# Patient Record
Sex: MALE
[De-identification: names, ages, dates, MRNs, and addresses within clinical notes are randomized per-mention and may not be internally consistent; named-entity substitution may affect disease eponyms.]

---

## 2018-08-21 ENCOUNTER — HOSPITAL ENCOUNTER (EMERGENCY)
Dept: HOSPITAL 5 - ED | Age: 28
Discharge: HOME | End: 2018-08-21
Payer: COMMERCIAL

## 2018-08-21 VITALS — DIASTOLIC BLOOD PRESSURE: 75 MMHG | SYSTOLIC BLOOD PRESSURE: 142 MMHG

## 2018-08-21 DIAGNOSIS — F12.10: ICD-10-CM

## 2018-08-21 DIAGNOSIS — Z88.0: ICD-10-CM

## 2018-08-21 DIAGNOSIS — F17.200: ICD-10-CM

## 2018-08-21 DIAGNOSIS — M72.2: Primary | ICD-10-CM

## 2018-08-21 DIAGNOSIS — J06.9: ICD-10-CM

## 2018-08-21 DIAGNOSIS — N50.811: ICD-10-CM

## 2018-08-21 DIAGNOSIS — J45.909: ICD-10-CM

## 2018-08-21 LAB
BUN SERPL-MCNC: 11 MG/DL (ref 9–20)
BUN/CREAT SERPL: 14 %
CALCIUM SERPL-MCNC: 9.5 MG/DL (ref 8.4–10.2)
HCT VFR BLD CALC: 43 % (ref 35.5–45.6)
HEMOLYSIS INDEX: 9
HGB BLD-MCNC: 14.8 GM/DL (ref 11.8–15.2)
MCH RBC QN AUTO: 31 PG (ref 28–32)
MCHC RBC AUTO-ENTMCNC: 34 % (ref 32–34)
MCV RBC AUTO: 89 FL (ref 84–94)
PLATELET # BLD: 215 K/MM3 (ref 140–440)
RBC # BLD AUTO: 4.84 M/MM3 (ref 3.65–5.03)

## 2018-08-21 PROCEDURE — 99284 EMERGENCY DEPT VISIT MOD MDM: CPT

## 2018-08-21 PROCEDURE — 93975 VASCULAR STUDY: CPT

## 2018-08-21 PROCEDURE — 96372 THER/PROPH/DIAG INJ SC/IM: CPT

## 2018-08-21 PROCEDURE — 85027 COMPLETE CBC AUTOMATED: CPT

## 2018-08-21 PROCEDURE — 74176 CT ABD & PELVIS W/O CONTRAST: CPT

## 2018-08-21 PROCEDURE — 80048 BASIC METABOLIC PNL TOTAL CA: CPT

## 2018-08-21 PROCEDURE — 36415 COLL VENOUS BLD VENIPUNCTURE: CPT

## 2018-08-21 NOTE — ULTRASOUND REPORT
ULTRASOUND TESTICULAR DOPPLER COMPLETE



History: Pain for 3 weeks.



Technique:  Trans-scrotal ultrasound with spectral doppler 

interrogation.



Findings:



Both testes and epididymides are normal size, contour and

echotexture.  No hydrocele or varicocele.  No mass or pathologic

calcifications.



Spectral Doppler waveforms depict symmetric arterial flow to both

testes.



IMPRESSION:

Unremarkable testicular ultrasound.

## 2018-08-21 NOTE — EMERGENCY DEPARTMENT REPORT
ED General Adult HPI





- General


Chief complaint: Urogenital-Male


Stated complaint: PAIN LT ANKLE/SIDE


Time Seen by Provider: 18 11:32


Source: patient


Mode of arrival: Ambulatory


Limitations: No Limitations





- History of Present Illness


Initial comments: 





This is a 28-year-old male here report that he has left foot pain on and off 

for 3 weeks.  He denies any fall or injury.  Denies any swelling.  He reports 

the pain is worse waking up.  He is complaining the pain to his right flank 

times several days and right testicle pain 3 weeks.  Patient denies any 

swelling to his testicles.  Denies any concern for STD or any painful 

urination.  Patient denies any abdominal pain or nausea vomiting.  He also 

reports that he has wheezes or productive cough.  He said he has a history of 

asthma and this has been going on for months.  Denies any fever or chills.  

Denies any chest pain.  Patient that he was seen at [facility].  3 weeks ago and 

had ultrasound was difficult on the told it was normal.  The pain has resolved.

  Generalized pain is 8-10 and achy.  Pain is intermittent.  He said he took 

over-the-counter pain medication which is not helping.  Denies any swelling to 

his legs.  Pain to left foot on and off and mostly in the morning.  Worse with 

movement and better with rest.


MD Complaint: multiple complaints


Onset/Timing: 3


-: week(s)


Radiation: extremity, flank


Severity scale (0 -10): 8


Quality: aching


Consistency: intermittent


Improves with: none


Worsens with: movement


Associated Symptoms: cough, other (right flank pain).  denies: confusion, chest 

pain, diaphoresis, fever/chills, headaches, loss of appetite, malaise, nausea/

vomiting, rash, seizure, shortness of breath, syncope, weakness


Treatments Prior to Arrival: other (over-the-counter medication)





- Related Data


 Previous Rx's











 Medication  Instructions  Recorded  Last Taken  Type


 


Azithromycin [Zithromax Z-SID] 250 mg PO DAILY 5 Days #1 pkg 18 Unknown Rx


 


Cetirizine HCl [ZyrTEC] 10 mg PO QDAY 21 Days #21 18 Unknown Rx





 tab.rapdis   


 


Fluticasone [Flonase] 1 spray NS QDAY 14 Days #1 bottle 18 Unknown Rx


 


Ibuprofen [Motrin] 800 mg PO Q8HR PRN #15 tablet 18 Unknown Rx


 


methylPREDNISolone [Medrol Dose 4 mg PO DAILY #1 tab.ds.pk 18 Unknown Rx





Sid]    











 Allergies











Allergy/AdvReac Type Severity Reaction Status Date / Time


 


Penicillins Allergy  Unknown Verified 18 09:50














ED Review of Systems


ROS: 


Stated complaint: PAIN LT ANKLE/SIDE


Other details as noted in HPI





Constitutional: denies: chills, fever


ENT: denies: ear pain, throat pain, congestion


Respiratory: cough, wheezing.  denies: shortness of breath, SOB with exertion, 

SOB at rest, stridor


Cardiovascular: denies: chest pain, palpitations, edema, syncope


Gastrointestinal: denies: abdominal pain, nausea, vomiting, diarrhea, 

constipation, hematemesis, melena, hematochezia


Genitourinary: denies: urgency, dysuria, frequency, hematuria, discharge, 

testicular pain, testicular mass


Musculoskeletal: arthralgia, myalgia (flank pain).  denies: back pain, joint 

swelling


Skin: denies: rash, lesions


Neurological: denies: headache, weakness, paresthesias, abnormal gait, vertigo





ED Past Medical Hx





- Past Medical History


Previous Medical History?: Yes


Hx Asthma: Yes





- Surgical History


Past Surgical History?: No





- Family History


Family history: hypertension





- Social History


Smoking Status: Current Every Day Smoker


Substance Use Type: Alcohol, Marijuana





- Medications


Home Medications: 


 Home Medications











 Medication  Instructions  Recorded  Confirmed  Last Taken  Type


 


Azithromycin [Zithromax Z-SID] 250 mg PO DAILY 5 Days #1 pkg 18  Unknown 

Rx


 


Cetirizine HCl [ZyrTEC] 10 mg PO QDAY 21 Days #21 18  Unknown Rx





 tab.rapdis    


 


Fluticasone [Flonase] 1 spray NS QDAY 14 Days #1 bottle 18  Unknown Rx


 


Ibuprofen [Motrin] 800 mg PO Q8HR PRN #15 tablet 18  Unknown Rx


 


methylPREDNISolone [Medrol Dose 4 mg PO DAILY #1 tab.ds.pk 18  Unknown Rx





Sid]     














ED Physical Exam





- General


Limitations: No Limitations


General appearance: alert, in no apparent distress





- Head


Head exam: Present: atraumatic, normocephalic, normal inspection





- Eye


Eye exam: Present: normal appearance, PERRL, EOMI


Pupils: Present: normal accommodation





- ENT


ENT exam: Present: normal exam, normal orophraynx, mucous membranes moist, TM's 

normal bilaterally (bilateral TM congested without erythema), normal external 

ear exam, other (on his mucosa pale boggy with clear drainage)





- Neck


Neck exam: Present: normal inspection, full ROM.  Absent: tenderness, 

meningismus, lymphadenopathy





- Respiratory


Respiratory exam: Present: wheezes (upper lung field), other (just the cough).  

Absent: normal lung sounds bilaterally, respiratory distress, rales, rhonchi, 

stridor, chest wall tenderness, accessory muscle use, decreased breath sounds, 

prolonged expiratory





- Cardiovascular


Cardiovascular Exam: Present: regular rate, normal rhythm, normal heart sounds.

  Absent: systolic murmur, diastolic murmur





- GI/Abdominal


GI/Abdominal exam: Present: soft, normal bowel sounds.  Absent: distended, 

tenderness, guarding, rebound, rigid, organomegaly, mass





- Extremities Exam


Extremities exam: Present: normal inspection, full ROM, normal capillary refill

, other (No cce. + 2 pulses in all extremities, no neurovascular compromise.  

Both feet with normal exam.  No swelling, contusion laceration or abrasion.  

Full range of motion to both feet .).  Absent: tenderness, pedal edema, joint 

swelling, calf tenderness





- Back Exam


Back exam: Present: normal inspection, full ROM, other (ambulates without any 

difficulties).  Absent: CVA tenderness (R), CVA tenderness (L), muscle spasm, 

paraspinal tenderness, vertebral tenderness, rash noted





- Neurological Exam


Neurological exam: Present: alert, oriented X3





- Psychiatric


Psychiatric exam: Present: normal affect, normal mood





- Skin


Skin exam: Present: warm, dry, intact, normal color.  Absent: rash





ED Course


 Vital Signs











  18





  09:50


 


Temperature 97.9 F


 


Pulse Rate 96 H


 


Respiratory 18





Rate 


 


Blood Pressure 142/75


 


O2 Sat by Pulse 98





Oximetry 














- Reevaluation(s)


Reevaluation #1: 





18 12:07


Patient given albuterol 5 mg and Atrovent 1 mg as treatment and the patient 

milligrams IM in the emergency room upon reevaluation sounds are clear.


Reevaluation #2: 





18 12:23


Patient received Motrin 800 mg for pain which relieved his pain.





ED Medical Decision Making





- Lab Data


Result diagrams: 


 18 10:08





 18 10:08








 Lab Results











  18 Range/Units





  10:08 10:08 


 


WBC  9.9   (4.5-11.0)  K/mm3


 


RBC  4.84   (3.65-5.03)  M/mm3


 


Hgb  14.8   (11.8-15.2)  gm/dl


 


Hct  43.0   (35.5-45.6)  %


 


MCV  89   (84-94)  fl


 


MCH  31   (28-32)  pg


 


MCHC  34   (32-34)  %


 


RDW  14.3   (13.2-15.2)  %


 


Plt Count  215   (140-440)  K/mm3


 


Sodium   141  (137-145)  mmol/L


 


Potassium   4.1  (3.6-5.0)  mmol/L


 


Chloride   104.8  ()  mmol/L


 


Carbon Dioxide   23  (22-30)  mmol/L


 


Anion Gap   17  mmol/L


 


BUN   11  (9-20)  mg/dL


 


Creatinine   0.8  (0.8-1.5)  mg/dL


 


Estimated GFR   > 60  ml/min


 


BUN/Creatinine Ratio   14  %


 


Glucose   110 H  ()  mg/dL


 


Calcium   9.5  (8.4-10.2)  mg/dL














- Radiology Data


Radiology results: report reviewed





Ultrasound testicle complete and CT scan of abdomen and pelvis with contrast 

dictated by radiologist's report reviewed by myself.  Please see details below





Patient: BLANCA BLUM MR#: [id number] 


: 1990 Acct:[id number] 


Age/Sex: 28 / M ADM Date: 18 


Loc: ED 


Attending Dr: 








Ordering Physician: TONA FRANCO MD 


Date of Service: 18 


Procedure(s): US testicular doppler comp 


Accession Number(s): [id number] 





cc: TONA FRANCO MD 








ULTRASOUND TESTICULAR DOPPLER COMPLETE 





History: Pain for 3 weeks. 





Technique: Trans-scrotal ultrasound with spectral doppler 


interrogation. 





Findings: 





Both testes and epididymides are normal size, contour and 


echotexture. No hydrocele or varicocele. No mass or pathologic 


calcifications. 





Spectral Doppler waveforms depict symmetric arterial flow to both 


testes. 





IMPRESSION: 


Unremarkable testicular ultrasound. 





Transcribed By: TTR 


Dictated By: MARK OLIVER JR, MD 


Electronically Authenticated By: MARK OLIVER JR, MD 


Signed Date/Time: 18 1037 














Cat Scan Report 


Signed 





Patient: BLANCA BLUM MR#: [id number] 


: 1990 Acct:[id number] 


Age/Sex: 28 / M ADM Date: 18 


Loc: ED 


Attending Dr: 








Ordering Physician: TONA FRANCO MD 


Date of Service: 18 


Procedure(s): CT abdomen pelvis wo con 


Accession Number(s): [id number] 





cc: TONA FRANCO MD 








CT ABDOMEN PELVIS WITHOUT CONTRAST: 





HISTORY: Right flank pain, right testicle pain. 





COMPARISON: none. 





TECHNIQUE: Helical CT in 1.25mm intervals without IV contrast. 


Sagittal and coronal reconstructions. 








FINDINGS: 





Lung bases: Normal. 





Liver: Normal. 





Biliary system: Normal. 





Pancreas: Normal. 





Spleen: Normal. 





Kidneys/ureters/bladder: Normal. 





Adrenal glands: Normal. 





Aorta: Normal. 





Intestines: Normal. 





Appendix: Normal. 





Pelvic viscera: Normal. 





Ascites: None. 





Adenopathy: None. 





Musculoskeletal: Normal. 








IMPRESSION: 


Unremarkable CT scan of the abdomen and pelvis without contrast. 





Transcribed By: TTR 


Dictated By: MARK OLIVER JR, MD 


Electronically Authenticated By: MARK OLIVER JR, MD 


Signed Date/Time: 18 1123 











DD/DT: 18 1122 


TD/TT: 18 1123





- Medical Decision Making





This is a 28-year-old male here reports that he is having multiple complaints 

include pain to right flank for several days and also report that he was having 

right testicular pain for 3 weeks but he went to [facility] due to the 

ultrasound and if everything is fine and that has resolved since.  He said now 

he is having cough and congestion with productive cough for over a month and 

also with left foot pain on and off for 3 weeks upon awakening.  He denies any 

injury or trauma.  He is here to be evaluated





This was screened by Dr. Franco and  orders placed for CBC and BMP which was 

stable.  He also had CT of the abdomen and pelvis with contrast which was 

dictated by radiologist's report reviewed by myself with normal findings.  

Physical findings for nasal mucosa pale and boggy with clear drainage, 

bilateral TM congested without any erythema and upper lung fields with wheezes,

normal work of breathing.  Bilateral exam to feet normal findings.  All other 

physical findings are normal.  I discussed the patient has lab results and the 

CT findings and I also discussed with him that my physical exam he has plantar 

fasciitis.  His left foot, he also has exacerbation of his asthma which is not 

better.  I told him that he will need to follow-up with orthopedic doctor 

regarding his left foot pain and to apply ice, rest elevated area to relieve 

pain and take anti-inflammatory which I will prescribe for him.  I also 

instructed him for his asthma I will put him on dose of steroid to include 

prednisone, renew his albuterol inhaler and place him on antibiotics since he 

has been having symptoms for over a month.  I encouraged him to stop smoking 

andpt does  have access to primary care and I will refer him to Dr. Rosenbaum who 

is PCP on call today.  Discussed with him that he should call today to schedule 

an appointment for a new patient visit and he voiced understanding.  Patient 

discharged home in stable condition, is feeling better.  Pain and breathing is 

better.  He is nontoxic in appearance and given a prescription for Medrol 

Dosepak, albuterol, Z-Sid and Motrin.


Critical care attestation.: 


If time is entered above; I have spent that time in minutes in the direct care 

of this critically ill patient, excluding procedure time.








ED Disposition


Clinical Impression: 


 Plantar fasciitis of left foot, Right flank pain, Asthma with bronchitis, URI 

with cough and congestion, Nicotine abuse





Disposition: DC-01 TO HOME OR SELFCARE


Is pt being admited?: No


Does the pt Need Aspirin: No


Condition: Stable


Instructions:  Acute Bronchitis (ED), Asthma (ED), Arthralgia (ED), Plantar 

Fasciitis (ED), RICE Therapy (ED), Flank Pain (ED), Upper Respiratory Infection 

(ED), Acute Cough (ED), How to Stop Smoking (ED)


Additional Instructions: 


Please follow up with orthopedic doctor as referred for your chronic foot pain 


Folllow up with Dr. Rosenbaum who is internal medicine for primary care visit for 

a chronic asthma


Take medication as prescribed


Increasing fluid intake


See discharge instruction on Rice therapy





Prescriptions: 


Azithromycin [Zithromax Z-SID] 250 mg PO DAILY 5 Days #1 pkg


Cetirizine HCl [ZyrTEC] 10 mg PO QDAY 21 Days #21 tab.rapdis


Fluticasone [Flonase] 1 spray NS QDAY 14 Days #1 bottle


Ibuprofen [Motrin] 800 mg PO Q8HR PRN #15 tablet


 PRN Reason: pain


methylPREDNISolone [Medrol Dose Sid] 4 mg PO DAILY #1 tab.ds.pk


Referrals: 


CARLOS ROSENBAUM MD [Staff Physician] - 2-3 Days


BUSHRA SUAREZ MD [Staff Physician] - 2-3 Days


Forms:  Work/School Release Form(ED)

## 2018-08-21 NOTE — CAT SCAN REPORT
CT ABDOMEN PELVIS WITHOUT CONTRAST:



HISTORY:  Right flank pain, right testicle pain.



COMPARISON: none.



TECHNIQUE:  Helical CT in 1.25mm intervals without IV contrast. 

Sagittal and coronal reconstructions. 





FINDINGS:



Lung bases: Normal.



Liver: Normal.



Biliary system: Normal.



Pancreas: Normal.



Spleen: Normal.



Kidneys/ureters/bladder: Normal.



Adrenal glands: Normal.



Aorta: Normal.



Intestines: Normal.



Appendix: Normal.



Pelvic viscera: Normal.



Ascites: None.



Adenopathy: None.



Musculoskeletal: Normal.





IMPRESSION:

Unremarkable CT scan of the abdomen and pelvis without contrast.

## 2022-02-01 ENCOUNTER — HOSPITAL ENCOUNTER (EMERGENCY)
Dept: HOSPITAL 5 - ED | Age: 32
Discharge: HOME | End: 2022-02-01
Payer: COMMERCIAL

## 2022-02-01 VITALS — DIASTOLIC BLOOD PRESSURE: 75 MMHG | SYSTOLIC BLOOD PRESSURE: 135 MMHG

## 2022-02-01 DIAGNOSIS — M25.562: Primary | ICD-10-CM

## 2022-02-01 DIAGNOSIS — F12.90: ICD-10-CM

## 2022-02-01 DIAGNOSIS — M10.9: ICD-10-CM

## 2022-02-01 DIAGNOSIS — Z88.0: ICD-10-CM

## 2022-02-01 DIAGNOSIS — F17.200: ICD-10-CM

## 2022-02-01 DIAGNOSIS — J45.909: ICD-10-CM

## 2022-02-01 LAB
BUN SERPL-MCNC: 15 MG/DL (ref 9–20)
BUN/CREAT SERPL: 17 %
CALCIUM SERPL-MCNC: 9.7 MG/DL (ref 8.4–10.2)
HCT VFR BLD CALC: 48 % (ref 35.5–45.6)
HEMOLYSIS INDEX: 39
HGB BLD-MCNC: 15.8 GM/DL (ref 11.8–15.2)
MCHC RBC AUTO-ENTMCNC: 33 % (ref 32–34)
MCV RBC AUTO: 90 FL (ref 84–94)
PLATELET # BLD: 236 K/MM3 (ref 140–440)
RBC # BLD AUTO: 5.35 M/MM3 (ref 3.65–5.03)

## 2022-02-01 PROCEDURE — 99283 EMERGENCY DEPT VISIT LOW MDM: CPT

## 2022-02-01 PROCEDURE — 80048 BASIC METABOLIC PNL TOTAL CA: CPT

## 2022-02-01 PROCEDURE — 84550 ASSAY OF BLOOD/URIC ACID: CPT

## 2022-02-01 PROCEDURE — 85027 COMPLETE CBC AUTOMATED: CPT

## 2022-02-01 PROCEDURE — 36415 COLL VENOUS BLD VENIPUNCTURE: CPT

## 2022-02-01 NOTE — EMERGENCY DEPARTMENT REPORT
ED General Adult HPI





- General


Chief complaint: Extremity Injury, Lower


Stated complaint: LFT LEG PAIN


Time Seen by Provider: 02/01/22 12:14


Source: patient


Mode of arrival: Wheelchair


Limitations: No Limitations





- History of Present Illness


Initial comments: 





31-year-old  male patient presents with complaints of sudden onset of 

left knee pain upon waking this morning.  He denies any injury and reports there

is swelling.  He has not tried any OTC medications for symptoms.  He rates his 

current pain as a 9/10 in severity and states it mainly occurs with bending and 

walking.  No past medical history per patient.  NKDA per patient


Severity scale (0 -10): 10





- Related Data


                                  Previous Rx's











 Medication  Instructions  Recorded  Last Taken  Type


 


Azithromycin [Zithromax Z-TESSIE] 250 mg PO DAILY 5 Days #1 pkg 08/21/18 Unknown Rx


 


Cetirizine HCl [ZyrTEC] 10 mg PO QDAY 21 Days #21 08/21/18 Unknown Rx





 tab.rapdis   


 


Fluticasone [Flonase] 1 spray NS QDAY 14 Days #1 bottle 08/21/18 Unknown Rx


 


Ibuprofen [Motrin] 800 mg PO Q8HR PRN #15 tablet 08/21/18 Unknown Rx


 


methylPREDNISolone [Medrol Dose 4 mg PO DAILY #1 tab.ds.pk 08/21/18 Unknown Rx





Tessie]    


 


Acetaminophen/Codeine [Tylenol 1 tab PO Q8H PRN #8 tab 02/01/22 Unknown Rx





/Codeine # 3 tab]    


 


Albuterol Mdi (or & Nicu Only) 2 puff IH QID PRN #8.5 gram 02/01/22 Unknown Rx





[ProAir HFA Inhaler]    


 


Indomethacin 50 mg PO Q8H PRN #21 cap 02/01/22 Unknown Rx


 


predniSONE [Deltasone] 20 mg PO BID 3 Days #6 tab 02/01/22 Unknown Rx











                                    Allergies











Allergy/AdvReac Type Severity Reaction Status Date / Time


 


Penicillins Allergy  Unknown Verified 02/01/22 11:57














ED Review of Systems


ROS: 


Stated complaint: LFT LEG PAIN


Other details as noted in HPI





Constitutional: denies: chills, diaphoresis, fever, malaise, weakness


Gastrointestinal: denies: abdominal pain


Genitourinary: denies: urgency, dysuria, frequency, hematuria, discharge, 

testicular pain, testicular mass


Musculoskeletal: joint swelling, arthralgia


Skin: denies: rash, lesions, change in color





ED Past Medical Hx





- Past Medical History


Hx Asthma: Yes





- Social History


Smoking Status: Current Every Day Smoker


Substance Use Type: Alcohol, Marijuana





- Medications


Home Medications: 


                                Home Medications











 Medication  Instructions  Recorded  Confirmed  Last Taken  Type


 


Azithromycin [Zithromax Z-TESSIE] 250 mg PO DAILY 5 Days #1 pkg 08/21/18  Unknown 

Rx


 


Cetirizine HCl [ZyrTEC] 10 mg PO QDAY 21 Days #21 08/21/18  Unknown Rx





 tab.rapdis    


 


Fluticasone [Flonase] 1 spray NS QDAY 14 Days #1 bottle 08/21/18  Unknown Rx


 


Ibuprofen [Motrin] 800 mg PO Q8HR PRN #15 tablet 08/21/18  Unknown Rx


 


methylPREDNISolone [Medrol Dose 4 mg PO DAILY #1 tab.ds.pk 08/21/18  Unknown Rx





Tessie]     


 


Acetaminophen/Codeine [Tylenol 1 tab PO Q8H PRN #8 tab 02/01/22  Unknown Rx





/Codeine # 3 tab]     


 


Albuterol Mdi (or & Nicu Only) 2 puff IH QID PRN #8.5 gram 02/01/22  Unknown Rx





[ProAir HFA Inhaler]     


 


Indomethacin 50 mg PO Q8H PRN #21 cap 02/01/22  Unknown Rx


 


predniSONE [Deltasone] 20 mg PO BID 3 Days #6 tab 02/01/22  Unknown Rx














ED Physical Exam





- General


Limitations: No Limitations


General appearance: alert, in no apparent distress, obese





- Head


Head exam: Present: atraumatic, normocephalic





- Eye


Eye exam: Present: normal appearance.  Absent: scleral icterus





- Respiratory


Respiratory exam: Absent: respiratory distress





- Cardiovascular


Cardiovascular Exam: Present: regular rate





- Extremities Exam


Extremities exam: Present: other (Mild tenderness to palpation noted to medial 

and lateral left knee with mild swelling; patient has full range of motion of 

the knee; no warmth or erythema is noted; normal pedal pulses are noted and 

normal sensation)





ED Course


                                   Vital Signs











  02/01/22





  11:54


 


Pulse Rate 88


 


Respiratory 20





Rate 


 


Blood Pressure 135/75





[Left] 


 


O2 Sat by Pulse 100





Oximetry 














ED Medical Decision Making





- Lab Data


Result diagrams: 


                                 02/01/22 13:55





                                 02/01/22 13:55








                                   Lab Results











  02/01/22 02/01/22 Range/Units





  13:55 13:55 


 


WBC  14.3 H   (4.5-11.0)  K/mm3


 


RBC  5.35 H   (3.65-5.03)  M/mm3


 


Hgb  15.8 H   (11.8-15.2)  gm/dl


 


Hct  48.0 H   (35.5-45.6)  %


 


MCV  90   (84-94)  fl


 


MCH  29   (28-32)  pg


 


MCHC  33   (32-34)  %


 


RDW  13.5   (13.2-15.2)  %


 


Plt Count  236   (140-440)  K/mm3


 


Uric Acid   8.4 H  (3.5-7.6)  mg/dL














- Radiology Data


Radiology results: report reviewed





XR knee 3V LT  


 


 INDICATION / CLINICAL INFORMATION:  


 acute pain and swelling, no injury.  


 


 COMPARISON:  


 None available.  


 


 FINDINGS:  


 BONES/JOINT(S): No acute fracture or subluxation. No significant degenerative 

changes. Small joint 


effusion.  


 


 SOFT TISSUES: No significant abnormality.  


 


 ADDITIONAL FINDINGS: None.





- Medical Decision Making








31-year-old  male patient presents with complaints of sudden onset of 

left knee pain upon waking this morning.  He denies any injury and reports there

 is swelling.  He has not tried any OTC medications for symptoms.  He rates his 

current pain as a 9/10 in severity and states it mainly occurs with bending and 

walking.  No past medical history per patient.  NKDA per patient





Temp taken by this provider and was noted to be 98.4





Pain control meds given here in ED.  X-ray of the knee is negative for any acute

 abnormalities.  Uric acid is elevated.  I suspect new onset gout.  Patient to 

discharge home with indomethacin and prednisone.  He is otherwise well-

appearing, his vitals are stable, and he is stable for discharge home.  

Discussed follow-up with primary care in 3 to 5 days and signs and symptoms that

 should prompt immediate return to the ED with patient, he verbalizes 

understanding.


Critical care attestation.: 


If time is entered above; I have spent that time in minutes in the direct care 

of this critically ill patient, excluding procedure time.








ED Disposition


Clinical Impression: 


 Acute pain of left knee, Gout





Disposition: 01 HOME / SELF CARE / HOMELESS


Is pt being admited?: No


Condition: Stable


Instructions:  Acute Knee Pain, Adult, Low-Purine Eating Plan


Additional Instructions: 


What is gout?


Gout is a form of arthritis. It can cause pain and swelling in the joints. At 

first, it tends to affect only 1 joint  most frequently the big toe. It happens

 in people who have too much uric acid in the blood. Uric acid is a chemical 

that is produced when the body breaks down certain foods. Uric acid can form 

sharp needle-like crystals that build up in the joints and cause pain. Uric acid

 crystals can also form inside the tubes that carry urine from the kidneys to 

the bladder. These crystals can turn into "kidney stones" that can cause pain 

and problems with the flow of urine.





What is gout?


Gout is a form of arthritis. It can cause pain and swelling in the joints. At 

first, it tends to affect only 1 joint  most frequently the big toe. It happens

 in people who have too much uric acid in the blood. Uric acid is a chemical 

that is produced when the body breaks down certain foods. Uric acid can form 

sharp needle-like crystals that build up in the joints and cause pain. Uric acid

 crystals can also form inside the tubes that carry urine from the kidneys to 

the bladder. These crystals can turn into "kidney stones" that can cause pain 

and problems with the flow of urine.





What are the symptoms of gout?


People with gout get sudden "flares" or attacks of severe pain, most often the 

big toe, ankle, or knee. Often the joint also turns red and swells. Usually, 

only 1 joint is affected, but some people have pain in more than 1 joint. Gout 

flares tend to happen more often during the night.





The pain from gout can be extreme. The pain and swelling are worst at the 

beginning of a gout flare. The symptoms then get better within a few days to 

weeks. It is not clear how the body "turns off" a gout flare.





Is there a test for gout?


Yes. To test you for gout, your doctor or nurse can take a sample of fluid from 

the joint that is in pain. If they find typical gout crystals in the fluid, then

 you have gout. Even without checking fluid from a joint, the doctor or nurse 

might still strongly suspect gout if:


What is gout?


Gout is a form of arthritis. It can cause pain and swelling in the joints. At fi

rst, it tends to affect only 1 joint  most frequently the big toe. It happens 

in people who have too much uric acid in the blood. Uric acid is a chemical that

 is produced when the body breaks down certain foods. Uric acid can form sharp 

needle-like crystals that build up in the joints and cause pain. Uric acid 

crystals can also form inside the tubes that carry urine from the kidneys to the

 bladder. These crystals can turn into "kidney stones" that can cause pain and 

problems with the flow of urine.





What are the symptoms of gout?


People with gout get sudden "flares" or attacks of severe pain, most often the 

big toe, ankle, or knee. Often the joint also turns red and swells. Usually, 

only 1 joint is affected, but some people have pain in more than 1 joint. Gout 

flares tend to happen more often during the night.





The pain from gout can be extreme. The pain and swelling are worst at the 

beginning of a gout flare. The symptoms then get better within a few days to 

weeks. It is not clear how the body "turns off" a gout flare.





Is there a test for gout?


Yes. To test you for gout, your doctor or nurse can take a sample of fluid from 

the joint that is in pain. If they find typical gout crystals in the fluid, then

 you have gout. Even without checking fluid from a joint, the doctor or nurse 

might still strongly suspect gout if:





?You have had pain and swelling in 1 joint, especially the joint at the base of 

the big toe





?Your symptoms completely go away between flares, at least when you first start 

having them





?Your blood tests show high levels of uric acid





How is gout treated?


There are a few medicines that can reduce the pain and swelling caused by gout. 

When you find one that works for you, make sure to keep it on hand all the time.

 That way you can take it as soon you feel a flare starting. Gout medicines work

 best if you take them as soon as symptoms start.





The medicines used to treat gout flares include:





?NSAIDs  This is a large group of medicines that includes ibuprofen (sample 

brand names: Advil, Motrin) and indomethacin (brand name: Indocin). NSAIDs might

 not be safe for people with kidney or liver disease, or for people who have 

bleeding problems.





?Colchicine  This medicine helps with gout but it can also cause diarrhea, 

nausea, vomiting, and stomach pain.





?Steroids  Steroids can reduce swelling and pain. These steroids are not the 

kind that athletes take to build up muscle. Steroids can be taken as pills or as

 shots.





Are there medicines to prevent gout flares?


Yes, there are medicines that can reduce the chances of having future gout 

flares. Most people who have repeated or severe flares of gout need to take 

these medicines. In general, they all work by reducing the amount of uric acid 

in the blood. Examples of these medicines include allopurinol (brand names: 

Aloprim, Zyloprim), febuxostat (brand name: Uloric), and probenecid. People with

 severe gout can also get a medicine called pegloticase (brand name: Krystexxa),

 which is given through a vein. This medicine can cause an allergic reaction in 

some people.





If you take one of the medicines to prevent gout, your doctor or nurse will want

 to make sure you use it safely. They might also want to check that your uric 

acid level gets low enough to dissolve the gout crystals. Allopurinol, 

febuxostat, and probenecid can actually increase gout flares when you first 

start taking them. To prevent these flares, your doctor or nurse might suggest 

that you take low doses of colchicine when you start the medicines. This will 

give the gout crystals time to dissolve, and that will put a stop to the flares 

over time. If you do have a gout flare, it's important to keep taking your daily

 medicines normally.





Your doctor will check your uric acid levels regularly. This is to make sure the

 medicines are working and you are taking the right dose.





Can I do anything on my own to prevent gout flares?


Yes. If you are overweight, losing weight can help relieve gout.





It's not clear that following a specific diet plan will help with gout symptoms.

 But eating a balanced diet can help improve your overall health. It can also 

help you lose weight, if you are overweight.





In general, a healthy diet includes plenty of fruits, vegetables, whole grains, 

and low-fat dairy products. It's also important to drink plenty of water, and 

try not to get dehydrated. You should limit sugary drinks and alcohol, which can

 make gout flares worse.





Some people with gout also have other health problems, such as heart disease, 

high blood pressure, kidney disease, or obesity. If you have any of these 

issues, it's important to work with your doctor to manage them. This can help 

improve your overall health and might also help with your gout.


Prescriptions: 


predniSONE [Deltasone] 20 mg PO BID 3 Days #6 tab


Indomethacin 50 mg PO Q8H PRN #21 cap


 PRN Reason: pain


Albuterol Mdi (or & Nicu Only) [ProAir HFA Inhaler] 2 puff IH QID PRN #8.5 gram


 PRN Reason: Shortness Of Breath


Acetaminophen/Codeine [Tylenol /Codeine # 3 tab] 1 tab PO Q8H PRN #8 tab


 PRN Reason: Pain , Severe (7-10)


Referrals: 


[facility] [Provider Group] - 3-5 Days


Forms:  Work/School Release Form(ED)

## 2022-02-01 NOTE — XRAY REPORT
XR knee 3V LT



INDICATION / CLINICAL INFORMATION:

acute pain and swelling, no injury.



COMPARISON:

None available.

 

FINDINGS:

BONES/JOINT(S): No acute fracture or subluxation. No significant degenerative changes. Small joint ef
fusion.



SOFT TISSUES: No significant abnormality.



ADDITIONAL FINDINGS: None.







Signer Name: Calvin Flynn MD 

Signed: 2/1/2022 1:39 PM

Workstation Name: [id number] [id number]